# Patient Record
Sex: FEMALE | Race: WHITE | NOT HISPANIC OR LATINO | URBAN - METROPOLITAN AREA
[De-identification: names, ages, dates, MRNs, and addresses within clinical notes are randomized per-mention and may not be internally consistent; named-entity substitution may affect disease eponyms.]

---

## 2018-06-26 ENCOUNTER — APPOINTMENT (OUTPATIENT)
Dept: WOMENS IMAGING | Facility: HOSPITAL | Age: 53
End: 2018-06-26

## 2018-06-26 PROCEDURE — 77062 BREAST TOMOSYNTHESIS BI: CPT | Performed by: RADIOLOGY

## 2018-06-26 PROCEDURE — 76641 ULTRASOUND BREAST COMPLETE: CPT | Performed by: RADIOLOGY

## 2018-06-26 PROCEDURE — 77066 DX MAMMO INCL CAD BI: CPT | Performed by: RADIOLOGY

## 2023-05-17 ENCOUNTER — OFFICE VISIT (OUTPATIENT)
Dept: RURAL CLINIC 2 | Facility: CLINIC | Age: 58
End: 2023-05-17
Payer: COMMERCIAL

## 2023-05-17 ENCOUNTER — LAB OUTSIDE AN ENCOUNTER (OUTPATIENT)
Dept: RURAL CLINIC 2 | Facility: CLINIC | Age: 58
End: 2023-05-17

## 2023-05-17 VITALS
DIASTOLIC BLOOD PRESSURE: 97 MMHG | SYSTOLIC BLOOD PRESSURE: 152 MMHG | BODY MASS INDEX: 34.6 KG/M2 | TEMPERATURE: 98 F | HEART RATE: 54 BPM | WEIGHT: 188 LBS | HEIGHT: 62 IN

## 2023-05-17 DIAGNOSIS — K21.9 GASTROESOPHAGEAL REFLUX DISEASE, UNSPECIFIED WHETHER ESOPHAGITIS PRESENT: ICD-10-CM

## 2023-05-17 DIAGNOSIS — R13.19 ESOPHAGEAL DYSPHAGIA: ICD-10-CM

## 2023-05-17 DIAGNOSIS — I10 HYPERTENSION, UNSPECIFIED TYPE: ICD-10-CM

## 2023-05-17 PROBLEM — 40890009: Status: ACTIVE | Noted: 2023-05-17

## 2023-05-17 PROBLEM — 266434009: Status: ACTIVE | Noted: 2023-05-17

## 2023-05-17 PROBLEM — 38341003: Status: ACTIVE | Noted: 2023-05-17

## 2023-05-17 PROBLEM — 698247007: Status: ACTIVE | Noted: 2023-05-17

## 2023-05-17 PROBLEM — 235595009: Status: ACTIVE | Noted: 2023-05-17

## 2023-05-17 PROBLEM — 79922009: Status: ACTIVE | Noted: 2023-05-17

## 2023-05-17 PROCEDURE — 99203 OFFICE O/P NEW LOW 30 MIN: CPT | Performed by: INTERNAL MEDICINE

## 2023-05-17 PROCEDURE — 99243 OFF/OP CNSLTJ NEW/EST LOW 30: CPT | Performed by: INTERNAL MEDICINE

## 2023-05-17 RX ORDER — LEVOTHYROXINE SODIUM 125 UG/1
1 TABLET IN THE MORNING ON AN EMPTY STOMACH TABLET ORAL ONCE A DAY
Status: ACTIVE | COMMUNITY

## 2023-05-17 RX ORDER — LEVOTHYROXINE SODIUM 25 UG/1
1 TABLET IN THE MORNING ON AN EMPTY STOMACH TABLET ORAL ONCE A DAY
Status: ACTIVE | COMMUNITY

## 2023-05-17 RX ORDER — CELECOXIB 200 MG/1
1 CAPSULE WITH FOOD CAPSULE ORAL ONCE A DAY
Status: DISCONTINUED | COMMUNITY

## 2023-05-17 RX ORDER — HYDROCHLOROTHIAZIDE 12.5 MG/1
1 CAPSULE IN THE MORNING CAPSULE, GELATIN COATED ORAL ONCE A DAY
Status: ACTIVE | COMMUNITY

## 2023-05-17 RX ORDER — ACETAMINOPHEN 325 MG
1 TABLET AS NEEDED TABLET ORAL
Status: DISCONTINUED | COMMUNITY

## 2023-05-17 RX ORDER — LISINOPRIL 5 MG/1
1 TABLET TABLET ORAL ONCE A DAY
Status: ACTIVE | COMMUNITY

## 2023-05-17 RX ORDER — PANTOPRAZOLE SODIUM 40 MG/1
1 TABLET TABLET, DELAYED RELEASE ORAL ONCE A DAY
Qty: 30 | Refills: 3 | OUTPATIENT
Start: 2023-05-17

## 2023-05-17 RX ORDER — ATORVASTATIN CALCIUM 20 MG/1
1 TABLET TABLET, FILM COATED ORAL ONCE A DAY
Status: ACTIVE | COMMUNITY

## 2023-05-17 NOTE — HPI-ZZZTODAY'S VISIT
The patient is a 57-year-old female who presents on referral from April FARHAN Montero for the evaluation of GERD.  A copy of this document to be sent to the referring provider.  The patient reports symptoms of epigastric abdominal burning with intermittent acid reflux and difficulty swallowing hot and cold liquids causing a spasm-like response in her esophagus ongoing for the past few months.  She denies nausea or vomiting.  She denies undergoing an upper endoscopy in the past.  She reports a family history of esophageal stricture in her sibling and mother.  She has had a colonoscopy in the past and was completed approximately 10 years ago and denies a history of colon polyps.  She recently completed a cardiac work-up for chest pain with Dr. Elise with findings of an arrhythmia and has a follow-up appointment next month.  Past medical history of hyperlipidemia, hypertension and hypothyroidism.

## 2023-09-29 ENCOUNTER — OFFICE VISIT (OUTPATIENT)
Dept: RURAL MEDICAL CENTER 4 | Facility: MEDICAL CENTER | Age: 58
End: 2023-09-29

## 2023-12-14 ENCOUNTER — OFFICE VISIT (OUTPATIENT)
Dept: RURAL MEDICAL CENTER 4 | Facility: MEDICAL CENTER | Age: 58
End: 2023-12-14
Payer: COMMERCIAL

## 2023-12-14 DIAGNOSIS — K22.89 DILATATION OF ESOPHAGUS: ICD-10-CM

## 2023-12-14 DIAGNOSIS — K29.60 ADENOPAPILLOMATOSIS GASTRICA: ICD-10-CM

## 2023-12-14 DIAGNOSIS — R13.19 CERVICAL DYSPHAGIA: ICD-10-CM

## 2023-12-14 PROCEDURE — 43249 ESOPH EGD DILATION <30 MM: CPT | Performed by: INTERNAL MEDICINE

## 2023-12-14 PROCEDURE — 43239 EGD BIOPSY SINGLE/MULTIPLE: CPT | Performed by: INTERNAL MEDICINE

## 2023-12-15 ENCOUNTER — TELEPHONE ENCOUNTER (OUTPATIENT)
Dept: RURAL CLINIC 2 | Facility: CLINIC | Age: 58
End: 2023-12-15

## 2023-12-26 ENCOUNTER — CLAIMS CREATED FROM THE CLAIM WINDOW (OUTPATIENT)
Dept: RURAL CLINIC 2 | Facility: CLINIC | Age: 58
End: 2023-12-26
Payer: COMMERCIAL

## 2023-12-26 ENCOUNTER — OFFICE VISIT (OUTPATIENT)
Dept: RURAL CLINIC 2 | Facility: CLINIC | Age: 58
End: 2023-12-26
Payer: COMMERCIAL

## 2023-12-26 ENCOUNTER — LAB OUTSIDE AN ENCOUNTER (OUTPATIENT)
Dept: RURAL CLINIC 2 | Facility: CLINIC | Age: 58
End: 2023-12-26

## 2023-12-26 VITALS
WEIGHT: 197 LBS | SYSTOLIC BLOOD PRESSURE: 151 MMHG | HEART RATE: 61 BPM | HEIGHT: 62 IN | TEMPERATURE: 97.3 F | BODY MASS INDEX: 36.25 KG/M2 | DIASTOLIC BLOOD PRESSURE: 81 MMHG

## 2023-12-26 DIAGNOSIS — K21.9 GASTROESOPHAGEAL REFLUX DISEASE WITHOUT ESOPHAGITIS: ICD-10-CM

## 2023-12-26 DIAGNOSIS — Z12.11 SCREENING FOR MALIGNANT NEOPLASM OF COLON: ICD-10-CM

## 2023-12-26 DIAGNOSIS — I10 ESSENTIAL HYPERTENSION: ICD-10-CM

## 2023-12-26 PROBLEM — 59621000: Status: ACTIVE | Noted: 2023-12-26

## 2023-12-26 PROBLEM — 266435005: Status: ACTIVE | Noted: 2023-12-26

## 2023-12-26 PROBLEM — 305058001: Status: ACTIVE | Noted: 2023-12-26

## 2023-12-26 PROCEDURE — 99213 OFFICE O/P EST LOW 20 MIN: CPT | Performed by: NURSE PRACTITIONER

## 2023-12-26 PROCEDURE — 99213 OFFICE O/P EST LOW 20 MIN: CPT | Performed by: INTERNAL MEDICINE

## 2023-12-26 RX ORDER — LEVOTHYROXINE SODIUM 125 UG/1
1 TABLET IN THE MORNING ON AN EMPTY STOMACH TABLET ORAL ONCE A DAY
Status: ACTIVE | COMMUNITY

## 2023-12-26 RX ORDER — HYDROCHLOROTHIAZIDE 12.5 MG/1
1 CAPSULE IN THE MORNING CAPSULE, GELATIN COATED ORAL ONCE A DAY
Status: ACTIVE | COMMUNITY

## 2023-12-26 RX ORDER — LEVOTHYROXINE SODIUM 25 UG/1
1 TABLET IN THE MORNING ON AN EMPTY STOMACH TABLET ORAL ONCE A DAY
Status: ACTIVE | COMMUNITY

## 2023-12-26 RX ORDER — PANTOPRAZOLE SODIUM 40 MG/1
1 TABLET TABLET, DELAYED RELEASE ORAL ONCE A DAY
Qty: 30 | Refills: 3 | Status: ACTIVE | COMMUNITY
Start: 2023-05-17

## 2023-12-26 RX ORDER — SODIUM PICOSULFATE, MAGNESIUM OXIDE, AND ANHYDROUS CITRIC ACID 12; 3.5; 1 G/175ML; G/175ML; MG/175ML
175 ML THE FIRST DOSE THE EVENING BEFORE AND SECOND DOSE THE MORNING OF COLONOSCOPY LIQUID ORAL ONCE A DAY
Qty: 350 | Refills: 0 | OUTPATIENT
Start: 2023-12-26 | End: 2023-12-28

## 2023-12-26 RX ORDER — ATORVASTATIN CALCIUM 20 MG/1
1 TABLET TABLET, FILM COATED ORAL ONCE A DAY
Status: DISCONTINUED | COMMUNITY

## 2023-12-26 RX ORDER — LISINOPRIL 5 MG/1
1 TABLET TABLET ORAL ONCE A DAY
Status: DISCONTINUED | COMMUNITY

## 2023-12-26 NOTE — HPI-ZZZTODAY'S VISIT
The patient is a 57-year-old female who presents on referral from April FARHAN Montero for the evaluation of GERD.  A copy of this document to be sent to the referring provider.  The patient reports symptoms of epigastric abdominal burning with intermittent acid reflux and difficulty swallowing hot and cold liquids causing a spasm-like response in her esophagus ongoing for the past few months.  She denies nausea or vomiting.  She denies undergoing an upper endoscopy in the past.  She reports a family history of esophageal stricture in her sibling and mother.  She has had a colonoscopy in the past and was completed approximately 10 years ago and denies a history of colon polyps.  She recently completed a cardiac work-up for chest pain with Dr. Elise with findings of an arrhythmia and has a follow-up appointment next month.  Past medical history of hyperlipidemia, hypertension and hypothyroidism. 12/26/2023 The patient returns for follow-up as is due for screening colonoscopy.  Her last one was 10 years ago and denies a history of colon polyps.  She is currently feeling well and denies abdominal pain, constipation, diarrhea and rectal bleeding.  Family history is negative for colon cancer.  She underwent an upper endoscopy a couple of weeks ago for chronic GERD and dysphagia.  Esophagus was found to be normal and underwent  empiric dilation to 20 mm  by dilation balloon.  The exam was otherwise found to be normal.  She reports no further difficulty swallowing and continues on pantoprazole daily with good control.

## 2024-01-17 ENCOUNTER — LAB OUTSIDE AN ENCOUNTER (OUTPATIENT)
Dept: RURAL CLINIC 2 | Facility: CLINIC | Age: 59
End: 2024-01-17

## 2024-01-17 ENCOUNTER — OFFICE VISIT (OUTPATIENT)
Dept: RURAL CLINIC 2 | Facility: CLINIC | Age: 59
End: 2024-01-17
Payer: COMMERCIAL

## 2024-01-17 VITALS
SYSTOLIC BLOOD PRESSURE: 138 MMHG | TEMPERATURE: 97.3 F | DIASTOLIC BLOOD PRESSURE: 105 MMHG | HEART RATE: 81 BPM | HEIGHT: 62 IN | WEIGHT: 189 LBS | BODY MASS INDEX: 34.78 KG/M2

## 2024-01-17 DIAGNOSIS — R11.0 NAUSEA: ICD-10-CM

## 2024-01-17 DIAGNOSIS — K76.0 FATTY LIVER DISEASE, NONALCOHOLIC: ICD-10-CM

## 2024-01-17 DIAGNOSIS — K52.9 NONINFECTIOUS GASTROENTERITIS: ICD-10-CM

## 2024-01-17 DIAGNOSIS — R10.32 LEFT LOWER QUADRANT ABDOMINAL PAIN: ICD-10-CM

## 2024-01-17 PROBLEM — 6142004: Status: ACTIVE | Noted: 2024-01-17

## 2024-01-17 PROBLEM — 1231824009: Status: ACTIVE | Noted: 2024-01-17

## 2024-01-17 PROBLEM — 301716002: Status: ACTIVE | Noted: 2024-01-17

## 2024-01-17 PROBLEM — 422587007: Status: ACTIVE | Noted: 2024-01-17

## 2024-01-17 PROBLEM — 863927004: Status: ACTIVE | Noted: 2024-01-17

## 2024-01-17 PROBLEM — 62315008: Status: ACTIVE | Noted: 2024-01-17

## 2024-01-17 PROBLEM — 305058001: Status: ACTIVE | Noted: 2024-01-17

## 2024-01-17 PROBLEM — 12574004: Status: ACTIVE | Noted: 2024-01-17

## 2024-01-17 PROBLEM — 14720231000119109: Status: ACTIVE | Noted: 2024-01-17

## 2024-01-17 PROCEDURE — 99214 OFFICE O/P EST MOD 30 MIN: CPT | Performed by: INTERNAL MEDICINE

## 2024-01-17 RX ORDER — UREA 10 %
1 TABLET LOTION (ML) TOPICAL ONCE A DAY
Status: ACTIVE | COMMUNITY

## 2024-01-17 RX ORDER — ONDANSETRON 4 MG/1
1 TABLET ON THE TONGUE AND ALLOW TO DISSOLVE TABLET, ORALLY DISINTEGRATING ORAL ONCE A DAY
Status: ACTIVE | COMMUNITY

## 2024-01-17 RX ORDER — CEPHALEXIN 500 MG/1
1 CAPSULE CAPSULE ORAL
Status: ACTIVE | COMMUNITY

## 2024-01-17 RX ORDER — PANTOPRAZOLE SODIUM 40 MG/1
1 TABLET TABLET, DELAYED RELEASE ORAL ONCE A DAY
Qty: 30 | Refills: 3 | Status: ACTIVE | COMMUNITY
Start: 2023-05-17

## 2024-01-17 RX ORDER — DOXYCYCLINE HYCLATE 100 MG/1
1 CAPSULE CAPSULE, GELATIN COATED ORAL ONCE A DAY
Status: ACTIVE | COMMUNITY

## 2024-01-17 RX ORDER — OMEGA-3/DHA/EPA/FISH OIL 120-180 MG
1 CAPSULE CAPSULE ORAL ONCE A DAY
Status: ACTIVE | COMMUNITY

## 2024-01-17 RX ORDER — HYDROCODONE BITARTRATE AND ACETAMINOPHEN 5; 300 MG/1; MG/1
1 TABLET AS NEEDED TABLET ORAL
Status: ACTIVE | COMMUNITY

## 2024-01-17 RX ORDER — LEVOTHYROXINE SODIUM 25 UG/1
1 TABLET IN THE MORNING ON AN EMPTY STOMACH TABLET ORAL ONCE A DAY
Status: ACTIVE | COMMUNITY

## 2024-01-17 RX ORDER — PREDNISONE 50 MG/1
1 TABLET TABLET ORAL ONCE A DAY
Status: ACTIVE | COMMUNITY

## 2024-01-17 RX ORDER — OSELTAMIVIR PHOSPHATE 75 MG/1
1 CAPSULE CAPSULE ORAL TWICE A DAY
Status: ACTIVE | COMMUNITY

## 2024-01-17 RX ORDER — HYDROCHLOROTHIAZIDE 12.5 MG/1
1 CAPSULE IN THE MORNING CAPSULE, GELATIN COATED ORAL ONCE A DAY
Status: ACTIVE | COMMUNITY

## 2024-01-17 RX ORDER — LEVOTHYROXINE SODIUM 125 UG/1
1 TABLET IN THE MORNING ON AN EMPTY STOMACH TABLET ORAL ONCE A DAY
Status: ACTIVE | COMMUNITY

## 2024-01-17 RX ORDER — PROMETHAZINE HYDROCHLORIDE 12.5 MG/1
1 TABLET AS NEEDED TABLET ORAL
Status: ACTIVE | COMMUNITY

## 2024-01-17 NOTE — HPI-ZZZTODAY'S VISIT
The patient is a 57-year-old female who presents on referral from April FARH[facility] Montero for the evaluation of GERD.  A copy of this document to be sent to the referring provider.  The patient reports symptoms of epigastric abdominal burning with intermittent acid reflux and difficulty swallowing hot and cold liquids causing a spasm-like response in her esophagus ongoing for the past few months.  She denies nausea or vomiting.  She denies undergoing an upper endoscopy in the past.  She reports a family history of esophageal stricture in her sibling and mother.  She has had a colonoscopy in the past and was completed approximately 10 years ago and denies a history of colon polyps.  She recently completed a cardiac work-up for chest pain with Dr. Elise with findings of an arrhythmia and has a follow-up appointment next month.  Past medical history of hyperlipidemia, hypertension and hypothyroidism. 12/26/2023 The patient returns for follow-up as is due for screening colonoscopy.  Her last one was 10 years ago and denies a history of colon polyps.  She is currently feeling well and denies abdominal pain, constipation, diarrhea and rectal bleeding.  Family history is negative for colon cancer.  She underwent an upper endoscopy a couple of weeks ago for chronic GERD and dysphagia.  Esophagus was found to be normal and underwent  empiric dilation to 20 mm  by dilation balloon.  The exam was otherwise found to be normal.  She reports no further difficulty swallowing and continues on pantoprazole daily with good control. 01/17/2024 Ms. Guerra is here today with complaints of nausea, vomiting, diarrhea and left lower quadrant abdominal pain that started 10 days ago.  She was initially seen at urgent care and diagnosed with the flu and received Tamiflu and antiemetics.  Unfortunately her symptoms worsened and proceeded to the emergency room  on 01/12/2024 where she underwent a noncontrast CT scan of the abdomen and pelvis revealing findings consistent with acute epiloic appendagitis along the left margin of the proximal sigmoid colon and diffuse fatty infiltrations of the liver.  She is here today reporting only mild improvement in left lower quadrant abdominal pain.  She has had no further vomiting however she continues to take Phenergan for nausea and is able to keep down liquids and mashed potatoes.  She continues with watery diarrhea occurring up to 5 times per day.  She is currently scheduled for a screening colonoscopy next month.

## 2024-01-21 ENCOUNTER — LAB OUTSIDE AN ENCOUNTER (OUTPATIENT)
Dept: RURAL CLINIC 8 | Facility: CLINIC | Age: 59
End: 2024-01-21

## 2024-01-25 ENCOUNTER — TELEPHONE ENCOUNTER (OUTPATIENT)
Dept: RURAL CLINIC 8 | Facility: CLINIC | Age: 59
End: 2024-01-25

## 2024-01-25 LAB
ADENOVIRUS F 40/41: NOT DETECTED
CALPROTECTIN, STOOL - QDX: (no result)
CAMPYLOBACTER: NOT DETECTED
CLOSTRIDIUM DIFFICILE: NOT DETECTED
ENTAMOEBA HISTOLYTICA: NOT DETECTED
ENTEROAGGREGATIVE E.COLI: NOT DETECTED
ENTEROTOXIGENIC E.COLI: NOT DETECTED
ESCHERICHIA COLI O157: NOT DETECTED
GIARDIA LAMBLIA: NOT DETECTED
NOROVIRUS GI/GII: NOT DETECTED
PANCREATICELASTASE ELISA, STOOL: (no result)
ROTAVIRUS A: NOT DETECTED
SALMONELLA SPP.: NOT DETECTED
SHIGA-LIKE TOXIN PRODUCING E.COLI: NOT DETECTED
SHIGELLA SPP. / ENTEROINVASIVE E.COLI: NOT DETECTED
VIBRIO PARAHAEMOLYTICUS: NOT DETECTED
VIBRIO SPP.: NOT DETECTED
YERSINIA ENTEROCOLITICA: NOT DETECTED

## 2024-01-25 RX ORDER — PANCRELIPASE LIPASE, PANCRELIPASE PROTEASE, PANCRELIPASE AMYLASE 40000; 126000; 168000 [USP'U]/1; [USP'U]/1; [USP'U]/1
AS DIRECTED CAPSULE, DELAYED RELEASE ORAL
Qty: 250 | Refills: 5 | OUTPATIENT
Start: 2024-01-25 | End: 2024-07-23

## 2024-02-05 ENCOUNTER — OV EP (OUTPATIENT)
Dept: RURAL CLINIC 2 | Facility: CLINIC | Age: 59
End: 2024-02-05

## 2024-02-05 RX ORDER — HYDROCODONE BITARTRATE AND ACETAMINOPHEN 5; 300 MG/1; MG/1
1 TABLET AS NEEDED TABLET ORAL
COMMUNITY

## 2024-02-05 RX ORDER — OSELTAMIVIR PHOSPHATE 75 MG/1
1 CAPSULE CAPSULE ORAL TWICE A DAY
COMMUNITY

## 2024-02-05 RX ORDER — LEVOTHYROXINE SODIUM 125 UG/1
1 TABLET IN THE MORNING ON AN EMPTY STOMACH TABLET ORAL ONCE A DAY
COMMUNITY

## 2024-02-05 RX ORDER — HYDROCHLOROTHIAZIDE 12.5 MG/1
1 CAPSULE IN THE MORNING CAPSULE, GELATIN COATED ORAL ONCE A DAY
COMMUNITY

## 2024-02-05 RX ORDER — LEVOTHYROXINE SODIUM 25 UG/1
1 TABLET IN THE MORNING ON AN EMPTY STOMACH TABLET ORAL ONCE A DAY
COMMUNITY

## 2024-02-05 RX ORDER — UREA 10 %
1 TABLET LOTION (ML) TOPICAL ONCE A DAY
COMMUNITY

## 2024-02-05 RX ORDER — PANTOPRAZOLE SODIUM 40 MG/1
1 TABLET TABLET, DELAYED RELEASE ORAL ONCE A DAY
Qty: 30 | Refills: 3 | COMMUNITY
Start: 2023-05-17

## 2024-02-05 RX ORDER — ONDANSETRON 4 MG/1
1 TABLET ON THE TONGUE AND ALLOW TO DISSOLVE TABLET, ORALLY DISINTEGRATING ORAL ONCE A DAY
COMMUNITY

## 2024-02-05 RX ORDER — PANCRELIPASE LIPASE, PANCRELIPASE PROTEASE, PANCRELIPASE AMYLASE 40000; 126000; 168000 [USP'U]/1; [USP'U]/1; [USP'U]/1
AS DIRECTED CAPSULE, DELAYED RELEASE ORAL
Qty: 250 | Refills: 5 | COMMUNITY
Start: 2024-01-25 | End: 2024-07-23

## 2024-02-05 RX ORDER — CEPHALEXIN 500 MG/1
1 CAPSULE CAPSULE ORAL
COMMUNITY

## 2024-02-05 RX ORDER — OMEGA-3/DHA/EPA/FISH OIL 120-180 MG
1 CAPSULE CAPSULE ORAL ONCE A DAY
COMMUNITY

## 2024-02-05 RX ORDER — PREDNISONE 50 MG/1
1 TABLET TABLET ORAL ONCE A DAY
COMMUNITY

## 2024-02-05 RX ORDER — DOXYCYCLINE HYCLATE 100 MG/1
1 CAPSULE CAPSULE, GELATIN COATED ORAL ONCE A DAY
COMMUNITY

## 2024-02-05 RX ORDER — PROMETHAZINE HYDROCHLORIDE 12.5 MG/1
1 TABLET AS NEEDED TABLET ORAL
COMMUNITY

## 2024-02-23 ENCOUNTER — COLON (OUTPATIENT)
Dept: RURAL MEDICAL CENTER 4 | Facility: MEDICAL CENTER | Age: 59
End: 2024-02-23

## 2024-03-27 ENCOUNTER — OV EP (OUTPATIENT)
Dept: RURAL CLINIC 2 | Facility: CLINIC | Age: 59
End: 2024-03-27
Payer: COMMERCIAL

## 2024-03-27 VITALS
BODY MASS INDEX: 36.51 KG/M2 | HEART RATE: 64 BPM | DIASTOLIC BLOOD PRESSURE: 97 MMHG | HEIGHT: 62 IN | WEIGHT: 198.4 LBS | TEMPERATURE: 98 F | SYSTOLIC BLOOD PRESSURE: 143 MMHG

## 2024-03-27 DIAGNOSIS — R74.8 ELEVATED LIPASE: ICD-10-CM

## 2024-03-27 DIAGNOSIS — K86.81 EXOCRINE PANCREATIC INSUFFICIENCY: ICD-10-CM

## 2024-03-27 DIAGNOSIS — R10.33 PERIUMBILICAL PAIN: ICD-10-CM

## 2024-03-27 PROBLEM — 47367009: Status: ACTIVE | Noted: 2024-03-27

## 2024-03-27 PROBLEM — 441957004: Status: ACTIVE | Noted: 2024-03-27

## 2024-03-27 PROBLEM — 443503005: Status: ACTIVE | Noted: 2024-03-27

## 2024-03-27 PROCEDURE — 99213 OFFICE O/P EST LOW 20 MIN: CPT | Performed by: NURSE PRACTITIONER

## 2024-03-27 RX ORDER — HYDROCODONE BITARTRATE AND ACETAMINOPHEN 5; 300 MG/1; MG/1
1 TABLET AS NEEDED TABLET ORAL
Status: DISCONTINUED | COMMUNITY

## 2024-03-27 RX ORDER — CEPHALEXIN 500 MG/1
1 CAPSULE CAPSULE ORAL
Status: DISCONTINUED | COMMUNITY

## 2024-03-27 RX ORDER — PANCRELIPASE LIPASE, PANCRELIPASE PROTEASE, PANCRELIPASE AMYLASE 40000; 126000; 168000 [USP'U]/1; [USP'U]/1; [USP'U]/1
AS DIRECTED CAPSULE, DELAYED RELEASE ORAL
Qty: 250 | Refills: 5 | Status: ACTIVE | COMMUNITY
Start: 2024-01-25 | End: 2024-07-23

## 2024-03-27 RX ORDER — OSELTAMIVIR PHOSPHATE 75 MG/1
1 CAPSULE CAPSULE ORAL TWICE A DAY
Status: ACTIVE | COMMUNITY

## 2024-03-27 RX ORDER — DOXYCYCLINE HYCLATE 100 MG/1
1 CAPSULE CAPSULE, GELATIN COATED ORAL ONCE A DAY
Status: DISCONTINUED | COMMUNITY

## 2024-03-27 RX ORDER — OMEGA-3/DHA/EPA/FISH OIL 120-180 MG
1 CAPSULE CAPSULE ORAL ONCE A DAY
Status: ACTIVE | COMMUNITY

## 2024-03-27 RX ORDER — HYDROCHLOROTHIAZIDE 12.5 MG/1
1 CAPSULE IN THE MORNING CAPSULE, GELATIN COATED ORAL ONCE A DAY
Status: DISCONTINUED | COMMUNITY

## 2024-03-27 RX ORDER — LEVOTHYROXINE SODIUM 25 UG/1
1 TABLET IN THE MORNING ON AN EMPTY STOMACH TABLET ORAL ONCE A DAY
Status: ACTIVE | COMMUNITY

## 2024-03-27 RX ORDER — PANTOPRAZOLE SODIUM 40 MG/1
1 TABLET TABLET, DELAYED RELEASE ORAL ONCE A DAY
Qty: 30 | Refills: 3 | Status: ACTIVE | COMMUNITY
Start: 2023-05-17

## 2024-03-27 RX ORDER — UREA 10 %
1 TABLET LOTION (ML) TOPICAL ONCE A DAY
Status: DISCONTINUED | COMMUNITY

## 2024-03-27 RX ORDER — PROMETHAZINE HYDROCHLORIDE 12.5 MG/1
1 TABLET AS NEEDED TABLET ORAL
Status: ACTIVE | COMMUNITY

## 2024-03-27 RX ORDER — PREDNISONE 50 MG/1
1 TABLET TABLET ORAL ONCE A DAY
Status: DISCONTINUED | COMMUNITY

## 2024-03-27 RX ORDER — ONDANSETRON 4 MG/1
1 TABLET ON THE TONGUE AND ALLOW TO DISSOLVE TABLET, ORALLY DISINTEGRATING ORAL ONCE A DAY
Status: ACTIVE | COMMUNITY

## 2024-03-27 RX ORDER — LEVOTHYROXINE SODIUM 125 UG/1
1 TABLET IN THE MORNING ON AN EMPTY STOMACH TABLET ORAL ONCE A DAY
Status: ACTIVE | COMMUNITY

## 2024-03-27 NOTE — HPI-ZZZTODAY'S VISIT
The patient is a 57-year-old female who presents on referral from April FARH[facility] Montero for the evaluation of GERD.  A copy of this document to be sent to the referring provider.  The patient reports symptoms of epigastric abdominal burning with intermittent acid reflux and difficulty swallowing hot and cold liquids causing a spasm-like response in her esophagus ongoing for the past few months.  She denies nausea or vomiting.  She denies undergoing an upper endoscopy in the past.  She reports a family history of esophageal stricture in her sibling and mother.  She has had a colonoscopy in the past and was completed approximately 10 years ago and denies a history of colon polyps.  She recently completed a cardiac work-up for chest pain with Dr. Elise with findings of an arrhythmia and has a follow-up appointment next month.  Past medical history of hyperlipidemia, hypertension and hypothyroidism. 12/26/2023 The patient returns for follow-up as is due for screening colonoscopy.  Her last one was 10 years ago and denies a history of colon polyps.  She is currently feeling well and denies abdominal pain, constipation, diarrhea and rectal bleeding.  Family history is negative for colon cancer.  She underwent an upper endoscopy a couple of weeks ago for chronic GERD and dysphagia.  Esophagus was found to be normal and underwent  empiric dilation to 20 mm  by dilation balloon.  The exam was otherwise found to be normal.  She reports no further difficulty swallowing and continues on pantoprazole daily with good control. 01/17/2024 Ms. Guerra is here today with complaints of nausea, vomiting, diarrhea and left lower quadrant abdominal pain that started 10 days ago.  She was initially seen at urgent care and diagnosed with the flu and received Tamiflu and antiemetics.  Unfortunately her symptoms worsened and proceeded to the emergency room  on 01/12/2024 where she underwent a noncontrast CT scan of the abdomen and pelvis revealing findings consistent with acute epiloic appendagitis along the left margin of the proximal sigmoid colon and diffuse fatty infiltrations of the liver.  She is here today reporting only mild improvement in left lower quadrant abdominal pain.  She has had no further vomiting however she continues to take Phenergan for nausea and is able to keep down liquids and mashed potatoes.  She continues with watery diarrhea occurring up to 5 times per day.  She is currently scheduled for a screening colonoscopy next month. 0/27/2024 The patient is following up from recent ER visits where she was seen for abdominal and lower back pain since February, got worse over the past couple of days and initially went to [facility] emergency room on 3/21/24  and had CT scan and lab work with unremarkable results and was discharged home.  She then presented to [facility] emergency room following day for continued pain and now with nausea and vomiting with CT imaging showing nonspecific, fluid-filled nondilated stomach, small bowel loops and right side large bowel loops, presumed gastroenteritis and no other acute findings were identified.  Lab work noted an elevated lipase level at 109.  She received IV fluids and was later discharged home.    She reports continued periumbilical abdominal pain with improvement. and is tolerating a bland diet.  She has had intermittent loose stools however this has been occurring since December.  She underwent stool studies in January revealing pancreatic insufficiency and is currently on digestive enzymes.  This was followed up with bidirectional endoscopy and her esophagus was empirically dilated for complaints of dysphagia, otherwise normal exam.  Precancer and hyperplastic polyps were found in the colon.

## 2024-03-28 ENCOUNTER — TELNP (OUTPATIENT)
Dept: URBAN - METROPOLITAN AREA TELEHEALTH 2 | Facility: TELEHEALTH | Age: 59
End: 2024-03-28
Payer: COMMERCIAL

## 2024-03-28 DIAGNOSIS — Z71.3 ENCOUNTER FOR NUTRITIONAL COUNSELING: ICD-10-CM

## 2024-03-28 PROCEDURE — 97802 MEDICAL NUTRITION INDIV IN: CPT | Performed by: DIETITIAN, REGISTERED

## 2024-03-28 RX ORDER — OMEGA-3/DHA/EPA/FISH OIL 120-180 MG
1 CAPSULE CAPSULE ORAL ONCE A DAY
Status: ACTIVE | COMMUNITY

## 2024-03-28 RX ORDER — LEVOTHYROXINE SODIUM 25 UG/1
1 TABLET IN THE MORNING ON AN EMPTY STOMACH TABLET ORAL ONCE A DAY
Status: ACTIVE | COMMUNITY

## 2024-03-28 RX ORDER — PANTOPRAZOLE SODIUM 40 MG/1
1 TABLET TABLET, DELAYED RELEASE ORAL ONCE A DAY
Qty: 30 | Refills: 3 | Status: ACTIVE | COMMUNITY
Start: 2023-05-17

## 2024-03-28 RX ORDER — ONDANSETRON 4 MG/1
1 TABLET ON THE TONGUE AND ALLOW TO DISSOLVE TABLET, ORALLY DISINTEGRATING ORAL ONCE A DAY
Status: ACTIVE | COMMUNITY

## 2024-03-28 RX ORDER — PROMETHAZINE HYDROCHLORIDE 12.5 MG/1
1 TABLET AS NEEDED TABLET ORAL
Status: ACTIVE | COMMUNITY

## 2024-03-28 RX ORDER — PANCRELIPASE LIPASE, PANCRELIPASE PROTEASE, PANCRELIPASE AMYLASE 40000; 126000; 168000 [USP'U]/1; [USP'U]/1; [USP'U]/1
AS DIRECTED CAPSULE, DELAYED RELEASE ORAL
Qty: 250 | Refills: 5 | Status: ACTIVE | COMMUNITY
Start: 2024-01-25 | End: 2024-07-23

## 2024-03-28 RX ORDER — OSELTAMIVIR PHOSPHATE 75 MG/1
1 CAPSULE CAPSULE ORAL TWICE A DAY
Status: ACTIVE | COMMUNITY

## 2024-03-28 RX ORDER — LEVOTHYROXINE SODIUM 125 UG/1
1 TABLET IN THE MORNING ON AN EMPTY STOMACH TABLET ORAL ONCE A DAY
Status: ACTIVE | COMMUNITY

## 2024-08-05 ENCOUNTER — TELEPHONE ENCOUNTER (OUTPATIENT)
Dept: RURAL CLINIC 2 | Facility: CLINIC | Age: 59
End: 2024-08-05

## 2024-08-05 RX ORDER — PANCRELIPASE LIPASE, PANCRELIPASE PROTEASE, PANCRELIPASE AMYLASE 40000; 126000; 168000 [USP'U]/1; [USP'U]/1; [USP'U]/1
AS DIRECTED CAPSULE, DELAYED RELEASE ORAL
Qty: 180 | Refills: 8
Start: 2024-01-25 | End: 2025-05-02